# Patient Record
Sex: FEMALE | Race: BLACK OR AFRICAN AMERICAN | NOT HISPANIC OR LATINO | Employment: FULL TIME | URBAN - METROPOLITAN AREA
[De-identification: names, ages, dates, MRNs, and addresses within clinical notes are randomized per-mention and may not be internally consistent; named-entity substitution may affect disease eponyms.]

---

## 2018-11-12 ENCOUNTER — DOCUMENTATION ONLY (OUTPATIENT)
Dept: PSYCHIATRY | Facility: HOSPITAL | Age: 43
End: 2018-11-12

## 2018-11-12 NOTE — PROGRESS NOTES
Sw received a call from pt on 11/1 in regards to BMU program. Sw explained BMU requirements to pt and pt voiced verbal understanding. Sw placed a referral to  to verify ins benefits. Sw placed a call to pt x2 on 11/2/18 no answer sw left a vm for immediate call back. Sw placed a call to pt on 11/3/18 no answer sw left a vm for immediate call back. Pt returned call on 11/12 and spoke with colleague in regards to benefit info. Merrill placed a call to pt at 11:45 am in regards to start date. No answer sw left a vm for immediate call back.    Jory Salazar LCSW

## 2018-11-16 ENCOUNTER — HOSPITAL ENCOUNTER (OUTPATIENT)
Dept: PSYCHIATRY | Facility: HOSPITAL | Age: 43
Discharge: HOME | End: 2018-11-16
Attending: PSYCHIATRY & NEUROLOGY
Payer: COMMERCIAL

## 2018-11-16 VITALS
WEIGHT: 135.31 LBS | RESPIRATION RATE: 16 BRPM | DIASTOLIC BLOOD PRESSURE: 57 MMHG | HEART RATE: 79 BPM | SYSTOLIC BLOOD PRESSURE: 104 MMHG | BODY MASS INDEX: 23.1 KG/M2 | HEIGHT: 64 IN | TEMPERATURE: 98 F

## 2018-11-16 DIAGNOSIS — F33.1 MDD (MAJOR DEPRESSIVE DISORDER), RECURRENT EPISODE, MODERATE: Primary | ICD-10-CM

## 2018-11-16 DIAGNOSIS — M50.00 CERVICAL DISC DISEASE WITH MYELOPATHY: ICD-10-CM

## 2018-11-16 LAB
AMPHET+METHAMPHET UR QL: NEGATIVE
B-HCG UR QL: NEGATIVE
BARBITURATES UR QL SCN>200 NG/ML: NEGATIVE
BENZODIAZ UR QL SCN>200 NG/ML: NEGATIVE
BZE UR QL SCN: NEGATIVE
CANNABINOIDS UR QL SCN: NEGATIVE
CREAT UR-MCNC: 134 MG/DL
ETHANOL UR-MCNC: <10 MG/DL
METHADONE UR QL SCN>300 NG/ML: NEGATIVE
OPIATES UR QL SCN: NEGATIVE
PCP UR QL SCN>25 NG/ML: NEGATIVE
TOXICOLOGY INFORMATION: NORMAL

## 2018-11-16 PROCEDURE — 90853 GROUP PSYCHOTHERAPY: CPT

## 2018-11-16 PROCEDURE — 81025 URINE PREGNANCY TEST: CPT

## 2018-11-16 PROCEDURE — 90853 GROUP PSYCHOTHERAPY: CPT | Mod: ,,, | Performed by: PSYCHOLOGIST

## 2018-11-16 PROCEDURE — 99223 1ST HOSP IP/OBS HIGH 75: CPT | Mod: ,,, | Performed by: PSYCHIATRY & NEUROLOGY

## 2018-11-16 PROCEDURE — 80307 DRUG TEST PRSMV CHEM ANLYZR: CPT

## 2018-11-16 NOTE — PROGRESS NOTES
Group Psychotherapy (PhD/LCSW)    Site: [facility]    Clinical status of patient: Intensive Outpatient Program (IOP)    Date: 11/16/2018    Group Focus: Psychodynamic Group Psychotherapy    Length of service: 42619 - 45-50 minutes    Number of patients in attendance: 5    Referred by: Behavioral Medicine Unit Treatment Team    Target symptoms: Depression and Anxiety    Patient's response to treatment: Active Listening and Self-disclosure    Progress toward goals: Progressing adequately    Interval History: Pt introduced herself appropriately to the group and described reasons for being in treatment.    Diagnosis: MDD, Anxiety, PTSD     Plan: Continue treatment on BMU
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
## 2018-11-16 NOTE — PROGRESS NOTES
11/16/18 1000   Activity/Group Therapy Checklist   Group Activity   Attendance Attended   Follows Direction Followed directions   Group Interactions/Observations Interacted appropriately;Alert;Supportive;Sharing   Affect/Mood Range Normal range   Affect/Mood Display Appropriate   Goal Progression Progressing

## 2018-11-16 NOTE — TREATMENT PLAN
"[facility]  BEHAVIORAL MEDICINE UNIT  INTERDISCIPLINARY TREATMENT PLAN  INTENSIVE OUTPATIENT PROGRAM    INTERDISCIPLINARY  TREATMENT TEAM:    Jesus Houston M.D., Psychiatrist      Mary Jo Rasmussen, Ph.D., Clinical Psychologist    June Acevedo R.N., Registered Nurse    Jory Salazar, [facility],     Marquez Rai [facility],       Resident:    (Signatures scanned into record separately).      ESTIMATED LOS:  2 weeks      The patient has reviewed the treatment plan with staff and has signed the "Patient Responsibilities" form.    (Patient signature scanned into record separately).      TREATMENT PLAN    DIAGNOSIS:      Patient Education Needs/Barriers to Learning (i.e., Language, Reading, Comprehension): None        Support/Advocacy Services/Needs (i.e., Financial, Transportation, Medications): None        Community Resources (i.e., Alcoholics Anonymous, Al Anon): None  Patients Identified Goals:  1. Stop feeling depression  2. Stop having nightmares, cold sweats, waking up heart beating fast  3. Stop having panic attacks, anxiety  4. Stop having pain in front my head and pressure    Coping Skills:  1. Stay by myself  2. Walk and exercise  3. [place] through the day  4. Talk to my sister        Strengths:  1. Spending time with my son  2. Playing games to keep my mind off bad thoughts  3. Staying busy  4. Praying listening to spiritual music      Limitations:  1. Talking about my feelings  2. Believing this program will help my issues  3. Changing the way I look at things in my life  4.      Goals and Objectives:  1. Goal: Attend and participate in all groups   Objective measure: Progress notes indicating active listening,    self-disclosure, feedback   Time frame to reach goal: Each day    2. Goal: Medication management   Objective measure: Physician progress note indicating improved medication   status   Time frame to reach goal: By discharge    3. Goal: Reduce " depression   Objective measure: Physician progress note indicating depression is improved   Time frame to reach goal: By discharge    4.  Goal: Reduce anxiety   Objective measure: Physician progress note indicating anxiety is improved   Time frame to reach goal: By discharge    5. Goal: Develop stress coping skills   Objective measure: Patient self-report of improved coping   Time frame to reach goal: By discharge    6.  Goal: Address health problems   Objective measure: Physician progress note regarding management of health   problems   Time frame to reach goal: Within first week of treatment    7. Goal: Assess level of pain   Objective measure: Physician progress note regarding patient's self-reported pain   Time frame to reach goal: Within first week of treatment    8. Goal:    Objective measure:    Time frame to reach goal:    9. Goal:    Objective measure:    Time frame to reach goal:     10. Goal:    Objective measure:    Time frame to reach goal:       Group Interventions:    Psychodynamic Group Psychotherapy - 1 hour, 5 times per week  Goals: 1. Utilize group empathy and support for problem solving; 2. Apply stress management, communication, and assertiveness skills to personal issues; 3. Discuss mental health symptoms and explore strategies for coping; 4. Discuss ways to change lifestyle to maintain better emotional health.    Communication Skills - 1 hour, 2 times per week  Goals: 1. Learn rules of effective communication; 2. Improve listening skills; 3. Practice clear communication.    Nutrition and Health - 1 hour, 1 time per week  Goals: 1. Explore impact that nutrition has on health; 2. Identify positive nutritional choices; 3. Explore how nutrition relates to stress tolerance.    Personal Growth - 1 hour, 2 times per week  Goals: 1. Discuss the development of self; 2. Create personal awareness and insight; 3. Explore a variety of psycho-educational techniques.    Promoting Healthy Lifestyles - 1 hour,  1 time per week  Goals: 1. Understand the Biopsychosocial Model of Health; 2. Develop insight into how mental health can impact other dimensions of health; 3. Develop appropriate health promotion strategies.    Rational Emotive Therapy (RET) - 1 hour, 1 time per week  Goals: 1. Learn an action-oriented psychotherapy called RET; 2. Focus on identifying, challenging, and replacing self-defeating thoughts and beliefs; 3. Explore how healthier thinking can lead to healthier emotional well-being.    Relationship Dynamics - 1 hour, 1 time per week  Goals: 1. Learn about factors that shape relationships; 2. Understand the central role of relationships in personal well-being; 3. Learn how to improve all relationships.    Relaxation Training - 1 hour, 3 times per week  Goals: 1. Learn about and implement various techniques for releasing physical tension from the body.    Spirituality - 1 hour, 1 time per week  Goals: 1. Discuss and reflect on the process of seeking peace and comfort; 2. Identify healthy ways of exploring spirituality.    Stress Management - 1 hour, 4 times per week  Goals: 1. Identify types and levels of stress; 2. Identify and change maladaptive beliefs and behaviors; 3. Identify and practice techniques of stress management.

## 2018-11-19 ENCOUNTER — HOSPITAL ENCOUNTER (OUTPATIENT)
Dept: PSYCHIATRY | Facility: HOSPITAL | Age: 43
Discharge: HOME | End: 2018-11-19
Attending: PSYCHIATRY & NEUROLOGY
Payer: COMMERCIAL

## 2018-11-19 VITALS — RESPIRATION RATE: 16 BRPM | DIASTOLIC BLOOD PRESSURE: 61 MMHG | SYSTOLIC BLOOD PRESSURE: 108 MMHG | HEART RATE: 70 BPM

## 2018-11-19 DIAGNOSIS — M50.00 CERVICAL DISC DISEASE WITH MYELOPATHY: ICD-10-CM

## 2018-11-19 DIAGNOSIS — F33.1 MDD (MAJOR DEPRESSIVE DISORDER), RECURRENT EPISODE, MODERATE: Primary | ICD-10-CM

## 2018-11-19 PROCEDURE — 90853 GROUP PSYCHOTHERAPY: CPT | Mod: ,,, | Performed by: PSYCHOLOGIST

## 2018-11-19 PROCEDURE — 90853 GROUP PSYCHOTHERAPY: CPT

## 2018-11-19 PROCEDURE — 90853 GROUP PSYCHOTHERAPY: CPT | Performed by: SOCIAL WORKER

## 2018-11-19 NOTE — PROGRESS NOTES
Group Psychotherapy (PhD/LCSW)    Site: [facility]    Clinical status of patient: Intensive Outpatient Program (IOP)    Date: 11/19/2018    Group Focus:  Communication Skills       Length of service: 39774 - 45-50 minutes    Number of patients in attendance: 14    Referred by: Behavioral Medicine Unit Treatment Team    Target symptoms: Depression and Anxiety    Patient's response to treatment: Active Listening     Progress toward goals: Progressing adequately    Interval History:Explained the differences between aggressive, passive, and assertive communication. Demonstrated the way I-messages can be use to communicate assertively. Modeled how to formulate and deliver I-messages    Diagnosis: MDD, Anxiety, PTSD     Plan: Continue treatment on [facility]

## 2018-11-19 NOTE — PLAN OF CARE
11/19/18 1000   Activity/Group Therapy Checklist   Group Meditation/Relaxation  (Weekend Check In )   Attendance Attended   Follows Direction Followed directions   Group Interactions/Observations Interacted appropriately   Affect/Mood Range Normal range   Affect/Mood Display Appropriate   Goal Progression Progressing

## 2018-11-20 ENCOUNTER — HOSPITAL ENCOUNTER (OUTPATIENT)
Dept: PSYCHIATRY | Facility: HOSPITAL | Age: 43
Discharge: HOME | End: 2018-11-20
Attending: PSYCHIATRY & NEUROLOGY
Payer: COMMERCIAL

## 2018-11-20 VITALS — DIASTOLIC BLOOD PRESSURE: 59 MMHG | HEART RATE: 65 BPM | SYSTOLIC BLOOD PRESSURE: 99 MMHG | RESPIRATION RATE: 16 BRPM

## 2018-11-20 DIAGNOSIS — M50.00 CERVICAL DISC DISEASE WITH MYELOPATHY: ICD-10-CM

## 2018-11-20 DIAGNOSIS — F33.1 MDD (MAJOR DEPRESSIVE DISORDER), RECURRENT EPISODE, MODERATE: Primary | ICD-10-CM

## 2018-11-20 PROCEDURE — 99233 SBSQ HOSP IP/OBS HIGH 50: CPT | Mod: ,,, | Performed by: PSYCHIATRY & NEUROLOGY

## 2018-11-20 PROCEDURE — 90853 GROUP PSYCHOTHERAPY: CPT

## 2018-11-20 PROCEDURE — 90853 GROUP PSYCHOTHERAPY: CPT | Mod: ,,, | Performed by: PSYCHOLOGIST

## 2018-11-20 RX ORDER — PROPRANOLOL HYDROCHLORIDE 10 MG/1
10 TABLET ORAL 2 TIMES DAILY PRN
COMMUNITY

## 2018-11-20 RX ORDER — PRAZOSIN HYDROCHLORIDE 1 MG/1
1 CAPSULE ORAL 2 TIMES DAILY PRN
COMMUNITY

## 2018-11-20 RX ORDER — BUTALBITAL, ACETAMINOPHEN AND CAFFEINE 50; 325; 40 MG/1; MG/1; MG/1
CAPSULE ORAL
COMMUNITY

## 2018-11-20 RX ORDER — HYDROXYZINE HYDROCHLORIDE 25 MG/1
25 TABLET, FILM COATED ORAL 2 TIMES DAILY PRN
COMMUNITY

## 2018-11-20 RX ORDER — SERTRALINE HYDROCHLORIDE 50 MG/1
50 TABLET, FILM COATED ORAL DAILY
Qty: 30 TABLET | Refills: 1 | Status: SHIPPED | OUTPATIENT
Start: 2018-11-20 | End: 2018-12-20

## 2018-11-20 RX ORDER — FLUOXETINE HYDROCHLORIDE 40 MG/1
40 CAPSULE ORAL DAILY
COMMUNITY

## 2018-11-20 NOTE — PLAN OF CARE
11/20/18 1100   Activity/Group Therapy Checklist   Group Educational  (Gratitude)   Attendance Attended   Follows Direction Followed directions   Group Interactions/Observations Interacted appropriately;Sharing  (Needed prompting. quiet.)   Affect/Mood Range Normal range   Affect/Mood Display Appropriate   Goal Progression Progressing

## 2018-11-20 NOTE — PROGRESS NOTES
Group Psychotherapy (PhD/LCSW)    Site: [facility]    Clinical status of patient: Intensive Outpatient Program (IOP)    Date: 11/20/2018    Group Focus: ACT Group Psychotherapy    Length of service: 81585 - 45-50 minutes    Number of patients in attendance: 14    Referred by: Behavioral Medicine Unit Treatment Team    Target symptoms: Depression and Anxiety    Patient's response to treatment: Active Listening and Self-disclosure    Progress toward goals: Progressing adequately    Interval History: Session focus was Fusion and Defusion.  Patients were introduced to defusion, relational frame theory, and defusion exercises.    Diagnosis: MDD, Anxiety, PTSD     Plan: Continue treatment on [facility]

## 2018-11-20 NOTE — PROGRESS NOTES
[facility]  Progress Note  Behavioral Medicine Unit    Date: 2018  Time: 9:38 AM    Name: Edel Ferreira    Age: 43 y.o.       : 1975            Admit Date:  2018    SUBJECTIVE:  Patient is a 43 y.o. old female with a history of trauma related to the death of her mother from cancer and the homicide and suicide committed by her father who is now experiencing exacerbation of PTSD symptoms in the context of a stressful work environment from which she was recently terminated.    At the started the program the patient was unable to recall the medication that she was currently on and the plan by her outpatient provider to make changes.  Today she brings in her medication bottles (with the exception of the fluoxetine 40 mg).  The bottles indicate that she is on prazosin 2 mg daily as needed, hydroxyzine 25 mg twice daily as needed, and propranolol 10 mg twice daily as needed.    The patient reports that her anxiety 3 days into treatment is not improved. She continues to report sleeping poorly, awakening with the sensation that she is shaking and breathing rapidly and thinking about work.  She denies that she is having nightmares about work but cannot explain why she awakens thinking about it.  She denies a desire to harm herself.  Her mood is not significantly improved.    Contacted the patient's outpatient provider, Dr. Hemal Hayward who indicated that the patient was unable to tolerate an increased dose of fluoxetine and had planned to cross taper to sertraline.      Current Medications:   Current Outpatient Medications on File Prior to Encounter   Medication Sig Dispense Refill    butalbital-acetaminophen-caff -40 mg -40 mg Cap Take by mouth.      FLUoxetine (PROZAC) 40 MG capsule Take 40 mg by mouth once daily.      hydrOXYzine HCl (ATARAX) 25 MG tablet Take 25 mg by mouth 2 (two) times daily as needed for Itching (anxietiy and sleep).      multivitamin  capsule Take 1 capsule by mouth once daily.      prazosin (MINIPRESS) 1 MG Cap Take 1 mg by mouth 2 (two) times daily as needed (nightmares).      propranolol (INDERAL) 10 MG tablet Take 10 mg by mouth 2 (two) times daily as needed.      [DISCONTINUED] meloxicam (MOBIC) 15 MG tablet Take 1 tablet (15 mg total) by mouth once daily. 30 tablet 1    [DISCONTINUED] methocarbamol (ROBAXIN) 500 MG Tab Take 1 tablet (500 mg total) by mouth 3 (three) times daily as needed (muscle spasm). 60 tablet 0     No current facility-administered medications on file prior to encounter.        Psychiatric ROS:  See Dr. Houston's Admission Note of date 11/16/2018 for ROS.    OBJECTIVE:  Vitals (Most Recent)   vitals were not taken for this visit.     Labs/Imaging/Studies:   Urine pregnancy test and urine toxicology screen from 11/16/2018 were both negative       Pain: /10    Musculoskeletal Exam:  Abnormal Involuntary Movements: none  Gait: non-ataxic     Mental Status Exam:  Appearance: age appropriate, casually dressed, neatly groomed  Behavior/Cooperation: cooperative, redirectable, eye contact normal  Speech: Not pressured loud or rapid, clearly audible, no slurring  Mood:  Depressed and anxious  Affect: appropriate and sad  Thought Process: goal-directed, logical  Thought Content: No SI, HI, VH, AH, delusions, or RIS  Orientation: grossly intact  Memory: intact for the content of the interview  Attention Span/Concentration: Attends to interview without distraction  Fund of Knowledge: adequate for interview  Estimate of Intelligence: above average from verbal skills and history  Cognition: grossly intact  Insight: patient has awareness of illness  Judgment: the patient's behavior is adequate to circumstances     ASSESSMENT/PLAN:      General Assessment:  Patient is a 43 y.o. female admitted to the [facility] partial hospitalization program for the treatment of depression anxiety.  She has a history of pre-existing PTSD which was  exacerbated by work related conflict and depression now exacerbated by losing her employment.     Diagnoses:  PTSD  MDD single episode moderate      Plan:  · Continue [facility] treatment  · Cross taper fluoxetine to sertraline 50 mg daily..  Patient given verbal instructions and expressed her understanding.  Discussed the likelihood of increasing to 100 mg.  · Patient was encouraged to take prazosin nightly in an attempt to interrupt her awakening with anxiety  · Continue hydroxyzine 25 mg twice daily as needed for anxiety  · Continue propranolol 10 mg twice daily as needed for anxiety          Jesus Houston MD

## 2018-11-20 NOTE — PROGRESS NOTES
Group Psychotherapy (PhD/LCSW)    Site: [facility]    Clinical status of patient: Intensive Outpatient Program (IOP)    Date: 11/20/2018    Group Focus: Psychodynamic Group Psychotherapy    Length of service: 16000 - 45-50 minutes    Number of patients in attendance: 6    Referred by: Behavioral Medicine Unit Treatment Team    Target symptoms: Depression and Anxiety    Patient's response to treatment: Active Listening and Self-disclosure    Progress toward goals: Progressing adequately    Interval History: Pt reports having difficulty with the concepts presented in an earlier group (about diffusion from thoughts) and getting caught up in the content of the words instead of the concepts. Worked on clarifying and illustrating the importance of these concepts for her.    Diagnosis: MDD, Anxiety, PTSD     Plan: Continue treatment on [facility]

## 2018-11-21 ENCOUNTER — HOSPITAL ENCOUNTER (OUTPATIENT)
Dept: PSYCHIATRY | Facility: HOSPITAL | Age: 43
Discharge: HOME | End: 2018-11-21
Attending: PSYCHIATRY & NEUROLOGY
Payer: COMMERCIAL

## 2018-11-21 DIAGNOSIS — F33.1 MDD (MAJOR DEPRESSIVE DISORDER), RECURRENT EPISODE, MODERATE: Primary | ICD-10-CM

## 2018-11-21 DIAGNOSIS — M50.00 CERVICAL DISC DISEASE WITH MYELOPATHY: ICD-10-CM

## 2018-11-21 PROCEDURE — 90853 GROUP PSYCHOTHERAPY: CPT

## 2018-11-21 PROCEDURE — 90853 GROUP PSYCHOTHERAPY: CPT | Mod: ,,, | Performed by: PSYCHOLOGIST

## 2018-11-21 NOTE — PROGRESS NOTES
Group Psychotherapy (PhD/LCSW)    Site: [facility]    Clinical status of patient: Intensive Outpatient Program (IOP)    Date: 11/21/2018    Group Focus: Psychodynamic Group Psychotherapy    Length of service: 61278 - 45-50 minutes    Number of patients in attendance: 5    Referred by: Behavioral Medicine Unit Treatment Team    Target symptoms: Depression and Anxiety    Patient's response to treatment: Active Listening and Self-disclosure    Progress toward goals: Progressing adequately    Interval History: Pt engaged in holiday weekend planning discussion today. She also asked for help in dealing with a situation with her brother.    Diagnosis: MDD, Anxiety, PTSD     Plan: Continue treatment on [facility]

## 2018-11-26 ENCOUNTER — HOSPITAL ENCOUNTER (OUTPATIENT)
Dept: PSYCHIATRY | Facility: HOSPITAL | Age: 43
Discharge: HOME | End: 2018-11-26
Attending: PSYCHIATRY & NEUROLOGY
Payer: COMMERCIAL

## 2018-11-26 DIAGNOSIS — F33.1 MDD (MAJOR DEPRESSIVE DISORDER), RECURRENT EPISODE, MODERATE: Primary | ICD-10-CM

## 2018-11-26 DIAGNOSIS — M50.00 CERVICAL DISC DISEASE WITH MYELOPATHY: ICD-10-CM

## 2018-11-26 PROCEDURE — 90853 GROUP PSYCHOTHERAPY: CPT

## 2018-11-26 PROCEDURE — 90853 GROUP PSYCHOTHERAPY: CPT | Performed by: SOCIAL WORKER

## 2018-11-26 PROCEDURE — 90853 GROUP PSYCHOTHERAPY: CPT | Mod: ,,, | Performed by: PSYCHOLOGIST

## 2018-11-26 PROCEDURE — 99232 SBSQ HOSP IP/OBS MODERATE 35: CPT | Mod: ,,, | Performed by: PSYCHIATRY & NEUROLOGY

## 2018-11-26 NOTE — PROGRESS NOTES
Group Psychotherapy (PhD/LCSW)    Site: [facility]    Clinical status of patient: Intensive Outpatient Program (IOP)    Date: 11/26/2018    Group Focus: Communication Skills       Length of service: 17509 - 45-50 minutes    Number of patients in attendance: 11    Referred by: Behavioral Medicine Unit Treatment Team    Target symptoms: Depression and Anxiety    Patient's response to treatment: Active Listening     Progress toward goals: Progressing adequately    Interval History: Discussed the application of basic communication skills (I-messages; Reflective Listening; Approach/Contextual considerations). Modeled and role played the use of these skills to enhance the communication process.     Diagnosis: MDD, Anxiety, PTSD     Plan: Continue treatment on [facility]

## 2018-11-26 NOTE — PROGRESS NOTES
[facility]  Progress Note  Behavioral Medicine Unit    Date: 2018  Time: 9:38 AM    Name: Edel Ferreira    Age: 43 y.o.       : 1975            Admit Date:  2018    SUBJECTIVE:  Patient is a 43 y.o. old female with a history of trauma related to the death of her mother from cancer and the homicide and suicide committed by her father who is now experiencing exacerbation of PTSD symptoms in the context of a stressful work environment from which she was recently terminated.    The patient started the titration from fluoxetine to sertraline as instructed.  She reports this is going well thus far.  She denies side effects to the new medication.  She reports her mood is now improved. She ran 2 miles yesterday.  She reports more energy.  She went to a friend's/family gathering for Thanksgiving enjoyed it.  She denies very much anxiety.  She denies suicidal ideation.  She realizes being around others is helpful.        Current Medications:   Current Outpatient Medications on File Prior to Encounter   Medication Sig Dispense Refill    butalbital-acetaminophen-caff -40 mg -40 mg Cap Take by mouth.      FLUoxetine (PROZAC) 40 MG capsule Take 40 mg by mouth once daily.      hydrOXYzine HCl (ATARAX) 25 MG tablet Take 25 mg by mouth 2 (two) times daily as needed for Itching (anxietiy and sleep).      multivitamin capsule Take 1 capsule by mouth once daily.      prazosin (MINIPRESS) 1 MG Cap Take 1 mg by mouth 2 (two) times daily as needed (nightmares).      propranolol (INDERAL) 10 MG tablet Take 10 mg by mouth 2 (two) times daily as needed.      sertraline (ZOLOFT) 50 MG tablet Take 1 tablet (50 mg total) by mouth once daily. 30 tablet 1     No current facility-administered medications on file prior to encounter.        Psychiatric ROS:  See Dr. Houston's Admission Note of date 2018 for ROS.    OBJECTIVE:  Vitals (Most Recent)   vitals were not taken for this  visit.     Labs/Imaging/Studies:   Urine pregnancy test and urine toxicology screen from 11/16/2018 were both negative       Pain: 0/10    Musculoskeletal Exam:  Abnormal Involuntary Movements: none  Gait: non-ataxic     Mental Status Exam:  Appearance: age appropriate, casually dressed, neatly groomed  Behavior/Cooperation: cooperative, redirectable, eye contact normal  Speech: Not pressured loud or rapid, clearly audible, no slurring  Mood:  Depressed and anxious  Affect: appropriate and sad  Thought Process: goal-directed, logical  Thought Content: No SI, HI, VH, AH, delusions, or RIS  Orientation: grossly intact  Memory: intact for the content of the interview  Attention Span/Concentration: Attends to interview without distraction  Fund of Knowledge: adequate for interview  Estimate of Intelligence: above average from verbal skills and history  Cognition: grossly intact  Insight: patient has awareness of illness  Judgment: the patient's behavior is adequate to circumstances     ASSESSMENT/PLAN:      General Assessment:  Patient is a 43 y.o. female admitted to the [facility] partial hospitalization program for the treatment of depression anxiety.  She has a history of pre-existing PTSD which was exacerbated by work related conflict and depression now exacerbated by losing her employment.     Diagnoses:  PTSD  MDD single episode moderate      Plan:  · Continue [facility]U treatment  · Continue cross taper of fluoxetine to sertraline..  · Continue prazosin for sleep  · Continue hydroxyzine 25 mg twice daily as needed for anxiety  · Continue propranolol 10 mg twice daily as needed for anxiety        Jesus Houston MD

## 2018-11-26 NOTE — PLAN OF CARE
11/26/18 1000   Activity/Group Therapy Checklist   Group Current Events  (Weekend Check In)   Attendance Attended   Follows Direction Followed directions   Group Interactions/Observations Interacted appropriately   Affect/Mood Range Normal range   Affect/Mood Display Appropriate   Goal Progression Progressing

## 2018-11-27 ENCOUNTER — HOSPITAL ENCOUNTER (OUTPATIENT)
Dept: PSYCHIATRY | Facility: HOSPITAL | Age: 43
Discharge: HOME | End: 2018-11-27
Attending: PSYCHIATRY & NEUROLOGY
Payer: COMMERCIAL

## 2018-11-27 DIAGNOSIS — M50.00 CERVICAL DISC DISEASE WITH MYELOPATHY: ICD-10-CM

## 2018-11-27 DIAGNOSIS — F33.1 MDD (MAJOR DEPRESSIVE DISORDER), RECURRENT EPISODE, MODERATE: Primary | ICD-10-CM

## 2018-11-27 PROCEDURE — 90853 GROUP PSYCHOTHERAPY: CPT | Mod: ,,, | Performed by: PSYCHOLOGIST

## 2018-11-27 PROCEDURE — 90853 GROUP PSYCHOTHERAPY: CPT

## 2018-11-27 NOTE — PROGRESS NOTES
Group Psychotherapy (PhD/LCSW)    Site: [facility]    Clinical status of patient: Intensive Outpatient Program (IOP)    Date: 11/27/2018    Group Focus: CBT Group Psychotherapy    Length of service: 96363 - 45-50 minutes    Number of patients in attendance: 9    Referred by: Behavioral Medicine Unit Treatment Team    Target symptoms: Depression and Anxiety    Patient's response to treatment: Active Listening and Self-disclosure    Progress toward goals: Progressing adequately    Interval History: Session focus was Cognitive Restructuring:  Identifying unhelpful and helpful thoughts.  Patients were provided with a worksheet on unhelpful thinking styles and how to identify helpful thoughts.  Patient identified wanting to work on the unhelpful thinking style(s) of personalization and critical words.     Diagnosis: MDD, Anxiety, PTSD     Plan: Continue treatment on [facility]

## 2018-11-27 NOTE — PROGRESS NOTES
"Group Psychotherapy (PhD/LCSW)    Site: [facility]    Clinical status of patient: Intensive Outpatient Program (IOP)    Date: 11/27/2018    Group Focus: Psychodynamic Group Psychotherapy    Length of service: 66336 - 45-50 minutes    Number of patients in attendance: 4    Referred by: Behavioral Medicine Unit Treatment Team    Target symptoms: Depression and Anxiety    Patient's response to treatment: Active Listening and Self-disclosure    Progress toward goals: Progressing adequately    Interval History: Pt reports she is doing "fine" today, though did not sleep well last night. She was fairly cognitively concrete when discussing several different topics, but did try to get advice to others. She otherwise did not talk much about her own situation.    Diagnosis: MDD, Anxiety, PTSD     Plan: Continue treatment on [facility]        "

## 2018-11-27 NOTE — PLAN OF CARE
11/27/18 1100   Activity/Group Therapy Checklist   Group Educational   Attendance Attended   Follows Direction Followed directions   Group Interactions/Observations Interacted appropriately;Supportive  (Had difficulty with discussing points related to group topic of discussion. )   Affect/Mood Range Normal range   Affect/Mood Display Appropriate   Goal Progression Progressing

## 2018-11-28 ENCOUNTER — HOSPITAL ENCOUNTER (OUTPATIENT)
Dept: PSYCHIATRY | Facility: HOSPITAL | Age: 43
Discharge: HOME | End: 2018-11-28
Attending: PSYCHIATRY & NEUROLOGY
Payer: COMMERCIAL

## 2018-11-28 DIAGNOSIS — M50.00 CERVICAL DISC DISEASE WITH MYELOPATHY: ICD-10-CM

## 2018-11-28 DIAGNOSIS — F33.1 MDD (MAJOR DEPRESSIVE DISORDER), RECURRENT EPISODE, MODERATE: Primary | ICD-10-CM

## 2018-11-28 PROCEDURE — 90853 GROUP PSYCHOTHERAPY: CPT | Mod: ,,, | Performed by: PSYCHOLOGIST

## 2018-11-28 PROCEDURE — 90853 GROUP PSYCHOTHERAPY: CPT

## 2018-11-28 PROCEDURE — 90853 GROUP PSYCHOTHERAPY: CPT | Mod: ,,, | Performed by: PSYCHIATRY & NEUROLOGY

## 2018-11-28 NOTE — PROGRESS NOTES
Group Psychotherapy (MD)     Site: [facility]     Clinical status of patient: Intensive Outpatient Program (IOP)     Date: 11/28/18     Group Focus: Medical and Neuropsychiatric Bases of Psychiatric Disease and Addiction: pain     Length of service: 43318 - 45-50 minutes     Number of patients in attendance: 12     Referred by:Behavioral Medicine Unit Treatment Team     Target symptoms: Pain     Patient's response to treatment: Active Listening       Interval History: Reviewed medical model of mood and anxiety disorders and substance abuse, encouraged view of psychiatric disease through this model to alleviate stigma and to identify helpful behaviors. Discussed pain in detail, including neurological, medical and psychological aspects. Discussed subjective nature of pain as well as multiple factors that play into pain. Utilized this information to brainstorm ways pain can be treated outside of taking pain medications. Highlighted risk of opioid use.     Progress toward goals: progressing adequately     Diagnosis: MDD, Anxiety, PTSD          Plan: Continue treatment on [facility]

## 2018-11-28 NOTE — PROGRESS NOTES
Group Psychotherapy (PhD/LCSW)    Site: [facility]    Clinical status of patient: Intensive Outpatient Program (IOP)    Date: 11/28/2018    Group Focus: CBT Group Psychotherapy    Length of service: 53318 - 45-50 minutes    Number of patients in attendance: 14    Referred by: Behavioral Medicine Unit Treatment Team    Target symptoms: Depression and Anxiety    Patient's response to treatment: Active Listening and Self-disclosure    Progress toward goals: Progressing adequately    Interval History: Session focus was Cognitive Restructuring (Part 2):  Identifying unhelpful and helpful thoughts.  Patients were provided with a worksheet on unhelpful thinking styles and how to identify helpful thoughts.  Patients read personal examples aloud of unhelpful and helpful thoughts.      Diagnosis: MDD, Anxiety, PTSD     Plan: Continue treatment on [facility]

## 2018-11-28 NOTE — PROGRESS NOTES
Group Psychotherapy (PhD/LCSW)    Site: [facility]    Clinical status of patient: Intensive Outpatient Program (IOP)    Date: 11/28/2018    Group Focus: Psychodynamic Group Psychotherapy    Length of service: 36251 - 45-50 minutes    Number of patients in attendance: 6    Referred by: Behavioral Medicine Unit Treatment Team    Target symptoms: Depression and Anxiety    Patient's response to treatment: Active Listening and Self-disclosure    Progress toward goals: Progressing adequately    Interval History: Pt reports that she slept better last night. She otherwise remained quiet throughout group, and continues to turn down opportunities to speak more about her current situation.    Diagnosis: MDD, Anxiety, PTSD     Plan: Continue treatment on [facility]

## 2018-11-29 ENCOUNTER — HOSPITAL ENCOUNTER (OUTPATIENT)
Dept: PSYCHIATRY | Facility: HOSPITAL | Age: 43
Discharge: HOME | End: 2018-11-29
Attending: PSYCHIATRY & NEUROLOGY
Payer: COMMERCIAL

## 2018-11-29 DIAGNOSIS — F33.1 MDD (MAJOR DEPRESSIVE DISORDER), RECURRENT EPISODE, MODERATE: Primary | ICD-10-CM

## 2018-11-29 DIAGNOSIS — M50.00 CERVICAL DISC DISEASE WITH MYELOPATHY: ICD-10-CM

## 2018-11-29 PROCEDURE — 90853 GROUP PSYCHOTHERAPY: CPT | Mod: ,,, | Performed by: PSYCHOLOGIST

## 2018-11-29 PROCEDURE — 90853 GROUP PSYCHOTHERAPY: CPT

## 2018-11-29 NOTE — PROGRESS NOTES
Group Psychotherapy (PhD/LCSW)    Site: [facility]    Clinical status of patient: Intensive Outpatient Program (IOP)    Date: 11/29/2018    Group Focus: Strength Training      Length of service: 38523 - 45-50 minutes    Number of patients in attendance: 5    Referred by: Behavioral Medicine Unit Treatment Team    Target symptoms: Depression and Anxiety    Patient's response to treatment: Active Listening and Self-disclosure    Progress toward goals: Progressing adequately    Interval History: Discussed psychological and interpersonal strategies for handling interactions with others that one considers a toxic (but necessary) presence in one's life.     Diagnosis: MDD, Anxiety, PTSD     Plan: Continue treatment on [facility]

## 2018-11-29 NOTE — PATIENT CARE CONFERENCE
Problem List:  PTSD  MDD single episode moderate      Pt staffing held today. Staff discussed pt's minimal progress and tangential topics in group. Staff discussed pt's support for peers and minimal processing of pt's own identified stressors. Staff discussed pt's concrete thinking and difficulty providing peer feedback. Staff discussed redirecting pt for leaving group prematurely and use of cellphone.        Follow Up: Medication Management -TBD  Psychotherapy -Jimy Jeffries LCSW     Staff Present:  MD Dr. Valery Mckeon, Psy. D  Jory Salazar, [facility]  Otf Rai, [facility]  June Acevedo RN

## 2018-11-29 NOTE — PROGRESS NOTES
Group Psychotherapy (PhD/LCSW)    Site: [facility]    Clinical status of patient: Intensive Outpatient Program (IOP)    Date: 11/29/2018    Group Focus: CBT Group Psychotherapy    Length of service: 10350 - 45-50 minutes    Number of patients in attendance: 12    Referred by: Behavioral Medicine Unit Treatment Team    Target symptoms: Depression and Anxiety    Patient's response to treatment: Active Listening and Self-disclosure    Progress toward goals: Progressing adequately    Interval History: Session focus was Cognitive Restructuring (Part 3):  Using the ABCD model to help identify helpful thoughts in situations.  Patients were encouraged to identify antecedents (triggers), beliefs, consequences/feelings, and different (helpful) thoughts.    Diagnosis: MDD, Anxiety, PTSD     Plan: Continue treatment on [facility]

## 2018-11-29 NOTE — PROGRESS NOTES
Group Psychotherapy (PhD/LCSW)    Site: [facility]    Clinical status of patient: Intensive Outpatient Program (IOP)    Date: 11/29/2018    Group Focus: Psychodynamic Group Psychotherapy    Length of service: 95162 - 45-50 minutes    Number of patients in attendance: 5    Referred by: Behavioral Medicine Unit Treatment Team    Target symptoms: Depression and Anxiety    Patient's response to treatment: Active Listening and Self-disclosure    Progress toward goals: Progressing adequately    Interval History: Pt discusses her embarassment and anger at her brother when she learned about some of his recent behavior. Discussed the importance of not over-personalizing another person's actions and allowing them to accept the consequences of their actions.    Diagnosis: MDD, Anxiety, PTSD     Plan: Continue treatment on [facility]

## 2018-11-29 NOTE — PSYCH
LEIDY discussed follow up tx plans w pt. Pt reported post follow up with Dr. Hayward at [facility]. Pt reported that she will schedule follow up during lunch. Pt requested to meet with therapist at [facility] for individual therapy. [facility]

## 2018-11-30 ENCOUNTER — HOSPITAL ENCOUNTER (OUTPATIENT)
Dept: PSYCHIATRY | Facility: HOSPITAL | Age: 43
Discharge: HOME | End: 2018-11-30
Attending: PSYCHIATRY & NEUROLOGY
Payer: COMMERCIAL

## 2018-11-30 DIAGNOSIS — F33.1 MDD (MAJOR DEPRESSIVE DISORDER), RECURRENT EPISODE, MODERATE: Primary | ICD-10-CM

## 2018-11-30 DIAGNOSIS — M50.00 CERVICAL DISC DISEASE WITH MYELOPATHY: ICD-10-CM

## 2018-11-30 PROCEDURE — 90853 GROUP PSYCHOTHERAPY: CPT

## 2018-11-30 PROCEDURE — 99238 HOSP IP/OBS DSCHRG MGMT 30/<: CPT | Mod: ,,, | Performed by: PSYCHIATRY & NEUROLOGY

## 2018-11-30 PROCEDURE — 90853 GROUP PSYCHOTHERAPY: CPT | Mod: 59,,, | Performed by: PSYCHOLOGIST

## 2018-11-30 NOTE — PROGRESS NOTES
Group Psychotherapy (PhD/LCSW)    Site: [facility]    Clinical status of patient: Intensive Outpatient Program (IOP)    Date: 11/30/2018    Group Focus: Psychodynamic Group Psychotherapy    Length of service: 03858 - 45-50 minutes    Number of patients in attendance: 5    Referred by: Behavioral Medicine Unit Treatment Team    Target symptoms: Depression and Anxiety    Patient's response to treatment: Active Listening and Self-disclosure    Progress toward goals: Progressing adequately    Interval History: Pt reviewed her progress in treatment and discussed future goals.     Diagnosis: MDD, Anxiety, PTSD     Plan: Complete BMU program; follow up with aftercare.

## 2018-11-30 NOTE — PROGRESS NOTES
Group Psychotherapy (PhD/LCSW)    Site: [facility]    Clinical status of patient: Intensive Outpatient Program (IOP)    Date: 11/30/2018    Group Focus: Stress Management    Length of service: 77579 - 45-50 minutes    Number of patients in attendance: 13    Referred by: Behavioral Medicine Unit Treatment Team    Target symptoms: Depression and Anxiety    Patient's response to treatment: Active Listening and Self-disclosure    Progress toward goals: Progressing adequately    Interval History: Group learned mindfulness techniques (breath, grounding/senses) to improve present-moment awareness, impulsive behavior tendencies, and tolerance of various emotional states.    Diagnosis: MDD, Anxiety, PTSD     Plan: Continue treatment on [facility]

## 2018-11-30 NOTE — PLAN OF CARE
11/30/18 1000   Activity/Group Therapy Checklist   Group Educational  (Relationship Dynamics)   Attendance Attended   Follows Direction Followed directions   Group Interactions/Observations Interacted appropriately;Sharing;Supportive  (Observed to have difficulty reading)   Affect/Mood Range Normal range   Affect/Mood Display Appropriate   Goal Progression Progressing

## 2018-11-30 NOTE — PROGRESS NOTES
[facility]  Discharge Note  Behavioral Medicine Unit    Date: 2018  Time: 2:05 pm    Name: Edel Ferreira    Age: 43 y.o.       : 1975            Admit Date:  2018    SUBJECTIVE:  Patient is a 43 y.o. old female with a history of trauma related to the death of her mother from cancer and the homicide and suicide committed by her father who is now experiencing exacerbation of PTSD symptoms in the context of a stressful work environment from which she was recently terminated.    The patient reports she is still struggling to get out of bed in the morning.  She also continues to awaken in the night and early morning anxious.  She sometimes does not have the awareness of having had a dream.  She is uncertain if prazosin has been helpful.  She admits that the anxiety however is improved. She is less frequently a desiring death.  She has no desire to harm herself.  She has not again exercised.  Her appetite is fine.  She realizes that a challenge will be to remain busy after leaving the program.  She is worried that she might sit around and be mopey if she does not get up and interact with others.    She transitioned from fluoxetine to sertraline without any side effects thus far.        Current Medications:   Current Outpatient Medications on File Prior to Encounter   Medication Sig Dispense Refill    butalbital-acetaminophen-caff -40 mg -40 mg Cap Take by mouth.      FLUoxetine (PROZAC) 40 MG capsule Take 40 mg by mouth once daily.      hydrOXYzine HCl (ATARAX) 25 MG tablet Take 25 mg by mouth 2 (two) times daily as needed for Itching (anxietiy and sleep).      multivitamin capsule Take 1 capsule by mouth once daily.      prazosin (MINIPRESS) 1 MG Cap Take 1 mg by mouth 2 (two) times daily as needed (nightmares).      propranolol (INDERAL) 10 MG tablet Take 10 mg by mouth 2 (two) times daily as needed.      sertraline (ZOLOFT) 50 MG tablet Take 1 tablet (50 mg  total) by mouth once daily. 30 tablet 1     No current facility-administered medications on file prior to encounter.        Psychiatric ROS:  See Dr. Houston's Admission Note of date 11/16/2018 for ROS.    OBJECTIVE:  Vitals (Most Recent)   vitals were not taken for this visit.     Labs/Imaging/Studies:   Urine pregnancy test and urine toxicology screen from 11/16/2018 were both negative       Pain: 0/10    Musculoskeletal Exam:  Abnormal Involuntary Movements: none  Gait: non-ataxic     Mental Status Exam:  Appearance: age appropriate, casually dressed, neatly groomed  Behavior/Cooperation: cooperative, redirectable, eye contact normal  Speech: Not pressured loud or rapid, clearly audible, no slurring  Mood:  Depressed and anxious  Affect:  mildly constricted, appropriate  Thought Process: goal-directed, logical  Thought Content: No SI, HI, VH, AH, delusions, or RIS  Orientation: grossly intact  Memory: intact for the content of the interview  Attention Span/Concentration: Attends to interview without distraction  Fund of Knowledge: adequate for interview  Estimate of Intelligence: above average from verbal skills and history  Cognition: grossly intact  Insight: patient has awareness of illness  Judgment: the patient's behavior is adequate to circumstances     ASSESSMENT/PLAN:      General Assessment:  Patient is a 43 y.o. female admitted to the [facility] partial hospitalization program for the treatment of depression anxiety.  She has a history of pre-existing PTSD which was exacerbated by work related conflict and depression now exacerbated by losing her employment.     Diagnoses:  PTSD  MDD single episode moderate      Plan:  · Discharge from U  · Continue sertraline 50 mg daily   · Continue prazosin for sleep  · Continue hydroxyzine 25 mg twice daily as needed for anxiety  · Continue propranolol 10 mg twice daily as needed for anxiety  · Discussed follow-up with Dr. Hayward in 3 days  · Discussed follow-up with  Mr. Jeffries  · Strongly encouraged attendance at aftercare group        Jesus Houston MD

## 2018-12-06 ENCOUNTER — OFFICE VISIT (OUTPATIENT)
Dept: PSYCHIATRY | Facility: CLINIC | Age: 43
End: 2018-12-06
Payer: COMMERCIAL

## 2018-12-06 DIAGNOSIS — F33.1 MDD (MAJOR DEPRESSIVE DISORDER), RECURRENT EPISODE, MODERATE: Primary | ICD-10-CM

## 2018-12-06 PROCEDURE — 90853 GROUP PSYCHOTHERAPY: CPT | Mod: S$GLB,,, | Performed by: PSYCHOLOGIST

## 2018-12-06 NOTE — PROGRESS NOTES
Group Psychotherapy    Site: [facility]    Clinical status of patient: Outpatient    12/6/2018    Length of service:50715-11byf    Referred by: Behavioral Medicine Unit     Number of patients in attendance: 8    Target symptoms: depression    Patient's response to intervention:  The patient's response to intervention is active listening, self-disclosure, feedback to other patients.    Progress toward goals and other mental status changes:  The patient's progress toward goals is good.    Interval history: Pt introduced herself to the group. She also shared a recent victory in setting a healthy boundary with a family member.    Diagnosis: MDD    Plan: group psychotherapy    Return to clinic: 1 week

## 2021-05-27 ENCOUNTER — OCCUPATIONAL HEALTH (OUTPATIENT)
Dept: URGENT CARE | Facility: CLINIC | Age: 46
End: 2021-05-27

## 2021-05-27 PROCEDURE — 80305 PR COLLECTION ONLY DRUG SCREEN: ICD-10-PCS | Mod: S$GLB,,, | Performed by: EMERGENCY MEDICINE

## 2021-05-27 PROCEDURE — 80305 DRUG TEST PRSMV DIR OPT OBS: CPT | Mod: S$GLB,,, | Performed by: EMERGENCY MEDICINE

## 2024-05-13 NOTE — PROGRESS NOTES
Group Psychotherapy (PhD/LCSW)    Site: [facility]    Clinical status of patient: Intensive Outpatient Program (IOP)    Date: 11/26/2018    Group Focus: Psychodynamic Group Psychotherapy    Length of service: 11953 - 45-50 minutes    Number of patients in attendance: 4    Referred by: Behavioral Medicine Unit Treatment Team    Target symptoms: Depression and Anxiety    Patient's response to treatment: Active Listening and Self-disclosure    Progress toward goals: Progressing adequately    Interval History: Pt 10 minutes late to group; had to ask her again about being on time. She describes that she used a lot of distraction over the weekend to help with anxiety, and that she accepted a lot of invitations to spend time with others so that she did not isolate herself.    Diagnosis: MDD, Anxiety, PTSD     Plan: Continue treatment on BMU         Satisfactory